# Patient Record
Sex: FEMALE | Race: ASIAN | NOT HISPANIC OR LATINO | Employment: UNEMPLOYED | URBAN - METROPOLITAN AREA
[De-identification: names, ages, dates, MRNs, and addresses within clinical notes are randomized per-mention and may not be internally consistent; named-entity substitution may affect disease eponyms.]

---

## 2017-02-18 ENCOUNTER — SURGERY - HEALTHEAST (OUTPATIENT)
Dept: SURGERY | Facility: HOSPITAL | Age: 22
End: 2017-02-18

## 2017-02-18 ENCOUNTER — ANESTHESIA - HEALTHEAST (OUTPATIENT)
Dept: SURGERY | Facility: HOSPITAL | Age: 22
End: 2017-02-18

## 2017-02-18 ASSESSMENT — MIFFLIN-ST. JEOR
SCORE: 1874.41
SCORE: 1877.13
SCORE: 1869.87

## 2017-02-19 ENCOUNTER — COMMUNICATION - HEALTHEAST (OUTPATIENT)
Dept: SURGERY | Facility: CLINIC | Age: 22
End: 2017-02-19

## 2018-06-11 ENCOUNTER — OFFICE VISIT - HEALTHEAST (OUTPATIENT)
Dept: FAMILY MEDICINE | Facility: CLINIC | Age: 23
End: 2018-06-11

## 2018-06-11 DIAGNOSIS — R10.13 EPIGASTRIC PAIN: ICD-10-CM

## 2018-06-11 LAB
ALBUMIN UR-MCNC: ABNORMAL MG/DL
APPEARANCE UR: CLEAR
BACTERIA #/AREA URNS HPF: ABNORMAL HPF
BILIRUB UR QL STRIP: ABNORMAL
COLOR UR AUTO: YELLOW
GLUCOSE UR STRIP-MCNC: NEGATIVE MG/DL
HGB UR QL STRIP: ABNORMAL
KETONES UR STRIP-MCNC: ABNORMAL MG/DL
LEUKOCYTE ESTERASE UR QL STRIP: ABNORMAL
NITRATE UR QL: NEGATIVE
PH UR STRIP: 7 [PH] (ref 5–8)
RBC #/AREA URNS AUTO: ABNORMAL HPF
SP GR UR STRIP: 1.01 (ref 1–1.03)
SQUAMOUS #/AREA URNS AUTO: ABNORMAL LPF
UROBILINOGEN UR STRIP-ACNC: ABNORMAL
WBC #/AREA URNS AUTO: ABNORMAL HPF

## 2018-06-12 LAB — BACTERIA SPEC CULT: NO GROWTH

## 2018-08-01 ENCOUNTER — OFFICE VISIT - HEALTHEAST (OUTPATIENT)
Dept: FAMILY MEDICINE | Facility: CLINIC | Age: 23
End: 2018-08-01

## 2018-08-01 DIAGNOSIS — K29.70 GASTRITIS: ICD-10-CM

## 2018-08-01 DIAGNOSIS — R11.10 VOMITING: ICD-10-CM

## 2018-08-01 ASSESSMENT — MIFFLIN-ST. JEOR: SCORE: 1860.8

## 2018-09-04 ENCOUNTER — RECORDS - HEALTHEAST (OUTPATIENT)
Dept: ADMINISTRATIVE | Facility: OTHER | Age: 23
End: 2018-09-04

## 2018-12-06 ENCOUNTER — RECORDS - HEALTHEAST (OUTPATIENT)
Dept: ADMINISTRATIVE | Facility: OTHER | Age: 23
End: 2018-12-06

## 2019-05-03 ENCOUNTER — COMMUNICATION - HEALTHEAST (OUTPATIENT)
Dept: FAMILY MEDICINE | Facility: CLINIC | Age: 24
End: 2019-05-03

## 2019-10-10 ENCOUNTER — COMMUNICATION - HEALTHEAST (OUTPATIENT)
Dept: FAMILY MEDICINE | Facility: CLINIC | Age: 24
End: 2019-10-10

## 2019-10-10 ENCOUNTER — OFFICE VISIT - HEALTHEAST (OUTPATIENT)
Dept: FAMILY MEDICINE | Facility: CLINIC | Age: 24
End: 2019-10-10

## 2019-10-10 ENCOUNTER — TRANSFERRED RECORDS (OUTPATIENT)
Dept: HEALTH INFORMATION MANAGEMENT | Facility: CLINIC | Age: 24
End: 2019-10-10

## 2019-10-10 DIAGNOSIS — Z01.419 WELL FEMALE EXAM WITH ROUTINE GYNECOLOGICAL EXAM: ICD-10-CM

## 2019-10-10 DIAGNOSIS — N89.8 VAGINAL ODOR: ICD-10-CM

## 2019-10-10 DIAGNOSIS — G89.29 CHRONIC PAIN OF BOTH SHOULDERS: ICD-10-CM

## 2019-10-10 DIAGNOSIS — L70.8 OTHER ACNE: ICD-10-CM

## 2019-10-10 DIAGNOSIS — N62 LARGE BREASTS: ICD-10-CM

## 2019-10-10 DIAGNOSIS — M25.512 CHRONIC PAIN OF BOTH SHOULDERS: ICD-10-CM

## 2019-10-10 DIAGNOSIS — M25.511 CHRONIC PAIN OF BOTH SHOULDERS: ICD-10-CM

## 2019-10-10 DIAGNOSIS — D22.30 CHANGE IN FACIAL MOLE: ICD-10-CM

## 2019-10-10 LAB
CLUE CELLS: NORMAL
TRICHOMONAS, WET PREP: NORMAL
YEAST, WET PREP: NORMAL

## 2019-10-10 ASSESSMENT — MIFFLIN-ST. JEOR: SCORE: 1767.81

## 2019-10-11 ENCOUNTER — COMMUNICATION - HEALTHEAST (OUTPATIENT)
Dept: FAMILY MEDICINE | Facility: CLINIC | Age: 24
End: 2019-10-11

## 2019-10-11 LAB
HPV SOURCE: NORMAL
HUMAN PAPILLOMA VIRUS 16 DNA: NEGATIVE
HUMAN PAPILLOMA VIRUS 18 DNA: NEGATIVE
HUMAN PAPILLOMA VIRUS FINAL DIAGNOSIS: NORMAL
HUMAN PAPILLOMA VIRUS OTHER HR: NEGATIVE
SPECIMEN DESCRIPTION: NORMAL

## 2019-10-15 ENCOUNTER — COMMUNICATION - HEALTHEAST (OUTPATIENT)
Dept: FAMILY MEDICINE | Facility: CLINIC | Age: 24
End: 2019-10-15

## 2019-10-17 ENCOUNTER — TELEPHONE (OUTPATIENT)
Dept: PLASTIC SURGERY | Facility: CLINIC | Age: 24
End: 2019-10-17

## 2019-10-17 NOTE — TELEPHONE ENCOUNTER
[facility] Call Center    Phone Message    May a detailed message be left on voicemail: yes    Reason for Call: Other: Jaz from [facility] called and would like update on the referral sent on 9/14/19 for breast reduction. Please call back Jaz. Thanks.     Action Taken: Message routed to:  [facility] ([facility] Plastic Surg

## 2019-10-17 NOTE — TELEPHONE ENCOUNTER
Called pt back, LVM.      Unsure if pt needs breast reduction, for cisgender woman or if pt is transgender needing top surgery. To be assessment when pt calls back.     Boris Cooney, SW  Transgender Care Coordinator

## 2019-10-18 ENCOUNTER — COMMUNICATION - HEALTHEAST (OUTPATIENT)
Dept: FAMILY MEDICINE | Facility: CLINIC | Age: 24
End: 2019-10-18

## 2019-10-31 ENCOUNTER — HEALTH MAINTENANCE LETTER (OUTPATIENT)
Age: 24
End: 2019-10-31

## 2019-11-19 ENCOUNTER — OFFICE VISIT (OUTPATIENT)
Dept: PLASTIC SURGERY | Facility: CLINIC | Age: 24
End: 2019-11-19
Attending: PLASTIC SURGERY
Payer: COMMERCIAL

## 2019-11-19 ENCOUNTER — TELEPHONE (OUTPATIENT)
Dept: SURGERY | Facility: CLINIC | Age: 24
End: 2019-11-19

## 2019-11-19 VITALS
BODY MASS INDEX: 39.05 KG/M2 | WEIGHT: 234.4 LBS | DIASTOLIC BLOOD PRESSURE: 72 MMHG | SYSTOLIC BLOOD PRESSURE: 106 MMHG | OXYGEN SATURATION: 100 % | HEIGHT: 65 IN | HEART RATE: 62 BPM | TEMPERATURE: 98.5 F

## 2019-11-19 DIAGNOSIS — N62 HYPERTROPHY OF BREAST: Primary | ICD-10-CM

## 2019-11-19 PROCEDURE — G0463 HOSPITAL OUTPT CLINIC VISIT: HCPCS | Mod: ZF

## 2019-11-19 RX ORDER — CEFAZOLIN SODIUM 1 G/50ML
1 INJECTION, SOLUTION INTRAVENOUS SEE ADMIN INSTRUCTIONS
Status: CANCELLED | OUTPATIENT
Start: 2019-11-19

## 2019-11-19 RX ORDER — CLINDAMYCIN PHOSPHATE 10 MG/G
GEL TOPICAL
Refills: 0 | COMMUNITY
Start: 2019-10-10

## 2019-11-19 RX ORDER — CEFAZOLIN SODIUM 2 G/50ML
2 SOLUTION INTRAVENOUS
Status: CANCELLED | OUTPATIENT
Start: 2019-11-19

## 2019-11-19 ASSESSMENT — PAIN SCALES - GENERAL: PAINLEVEL: NO PAIN (0)

## 2019-11-19 ASSESSMENT — MIFFLIN-ST. JEOR: SCORE: 1805.14

## 2019-11-19 NOTE — PROGRESS NOTES
NEW PATIENT VISIT      PRESENTING COMPLAINT:  Breast reduction.      HISTORY OF PRESENTING COMPLAINT:  Ms. Pepe is 24 years old.  She is here to discuss breast reduction surgery.  She has been large breasted all of her adult and adolescent life.  She has a lot of upper back, neck, shoulder pain, shoulder grooving from bra straps, inframammary fold rashes during summers.  She has used also some over-the-counter medications as well as supportive garments without continued relief.  Never had any problems with her breasts.  No family history of breast cancer, never had a mammogram.  She wears a DDD bra.  She would like to be around a B or C cup.  She is done having children.  She is okay if she cannot breastfeed.  She is okay with scars.      PAST MEDICAL HISTORY:  Nil.      PAST SURGICAL HISTORY:  Cholecystectomy.      MEDICATIONS:  Nil.      ALLERGIES:  Nil.      SOCIAL HISTORY:  Smokes about a pack every 3 days.  She has been doing that for a number of years.  Drinks socially.  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      REVIEW OF SYSTEMS:  Denies chest pain, shortness of breath, MI, CVA, DVT and PE.      PHYSICAL EXAMINATION:  Vital signs are stable.  She is afebrile, in no obvious distress.  She is 5 feet 4-3/4 inches, 234 pounds, BMI of 39 kg/m2.  Body surface area 2.13 m2.  On examination of her breasts, she has grade 3 ptosis.  Right breast slightly larger and lower than the left breast.  Sternal tgwfh-rr-dwqfll distance at about 40 cm.      ASSESSMENT AND PLAN:  Based on above findings, a diagnosis of symptomatic bilateral breast hypertrophy was made.  I had a long discussion with the patient about breast reduction.  Explained to her how it would be done, showed her where the scars would be, explained to her what it entailed.  I was very clear about the risks, benefits and alternatives including pain, infection, bleeding, scarring, asymmetry, seromas, hematomas, wound breakdown, wound dehiscence, prominent scar,  hypertrophic scar, keloid scar, sensation, loss of the nipple and breast, skin necrosis, fat necrosis, nipple necrosis, T-junction site necrosis, DVT, PE, MI, CVA, pneumonia, renal failure and death.  I was very clear that I cannot guarantee cup sizes as well.  Based on her Schnur scale, 775 grams needs to be removed from each breast.  I think that is possible and leave her at about a B cup size.  She may require free nipple graft.  All of this was discussed.  She was okay with it.  Consent obtained.  Photographs obtained.  We will get prior authorization and proceed as indicated.  She needs to be off all nicotine products for at least 6 weeks.  All questions were answered.  She was happy with the visit.  All exam done in the presence of my nurse.      Total time spent with patient 30 minutes, more than half was counseling.

## 2019-11-19 NOTE — LETTER
11/19/2019       RE: Manuel Pepe  [address]  [facility] [address]     Dear Colleague,    Thank you for referring your patient, Manuel Pepe, to the [facility] at [facility]. Please see a copy of my visit note below.    NEW PATIENT VISIT      PRESENTING COMPLAINT:  Breast reduction.      HISTORY OF PRESENTING COMPLAINT:  Ms. Pepe is 24 years old.  She is here to discuss breast reduction surgery.  She has been large breasted all of her adult and adolescent life.  She has a lot of upper back, neck, shoulder pain, shoulder grooving from bra straps, inframammary fold rashes during summers.  She has used also some over-the-counter medications as well as supportive garments without continued relief.  Never had any problems with her breasts.  No family history of breast cancer, never had a mammogram.  She wears a DDD bra.  She would like to be around a B or C cup.  She is done having children.  She is okay if she cannot breastfeed.  She is okay with scars.      PAST MEDICAL HISTORY:  Nil.      PAST SURGICAL HISTORY:  Cholecystectomy.      MEDICATIONS:  Nil.      ALLERGIES:  Nil.      SOCIAL HISTORY:  Smokes about a pack every 3 days.  She has been doing that for a number of years.  Drinks socially.  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      REVIEW OF SYSTEMS:  Denies chest pain, shortness of breath, MI, CVA, DVT and PE.      PHYSICAL EXAMINATION:  Vital signs are stable.  She is afebrile, in no obvious distress.  She is 5 feet 4-3/4 inches, 234 pounds, BMI of 39 kg/m2.  Body surface area 2.13 m2.  On examination of her breasts, she has grade 3 ptosis.  Right breast slightly larger and lower than the left breast.  Sternal oenuc-fj-cjcaim distance at about 40 cm.      ASSESSMENT AND PLAN:  Based on above findings, a diagnosis of symptomatic bilateral breast hypertrophy was made.  I had a long discussion with the patient about breast reduction.  Explained to her how it would be  done, showed her where the scars would be, explained to her what it entailed.  I was very clear about the risks, benefits and alternatives including pain, infection, bleeding, scarring, asymmetry, seromas, hematomas, wound breakdown, wound dehiscence, prominent scar, hypertrophic scar, keloid scar, sensation, loss of the nipple and breast, skin necrosis, fat necrosis, nipple necrosis, T-junction site necrosis, DVT, PE, MI, CVA, pneumonia, renal failure and death.  I was very clear that I cannot guarantee cup sizes as well.  Based on her Schnur scale, 775 grams needs to be removed from each breast.  I think that is possible and leave her at about a B cup size.  She may require free nipple graft.  All of this was discussed.  She was okay with it.  Consent obtained.  Photographs obtained.  We will get prior authorization and proceed as indicated.  She needs to be off all nicotine products for at least 6 weeks.  All questions were answered.  She was happy with the visit.  All exam done in the presence of my nurse.      Total time spent with patient 30 minutes, more than half was counseling.           Again, thank you for allowing me to participate in the care of your patient.      Sincerely,    ELIZABETH Zapata MD

## 2019-11-19 NOTE — NURSING NOTE
"Oncology Rooming Note    November 19, 2019 8:49 AM   Manuel Pepe is a 24 year old female who presents for:    Chief Complaint   Patient presents with     New Patient     New Patient.  Chronic pain of both shoulders.      Initial Vitals: /72   Pulse 62   Temp 98.5  F (36.9  C) (Oral)   Ht 1.645 m (5' 4.75\")   Wt 106.3 kg (234 lb 6.4 oz)   SpO2 100%   BMI 39.31 kg/m   Estimated body mass index is 39.31 kg/m  as calculated from the following:    Height as of this encounter: 1.645 m (5' 4.75\").    Weight as of this encounter: 106.3 kg (234 lb 6.4 oz). Body surface area is 2.2 meters squared.  No Pain (0) Comment: Data Unavailable   No LMP recorded.  Allergies reviewed: Yes  Medications reviewed: Yes    Medications: Medication refills not needed today.  Pharmacy name entered into EPIC: Data Unavailable    Clinical concerns: No additional concerns.  Dr. Zapata was notified.      Katelynn Martínez CMA              "

## 2019-11-19 NOTE — LETTER
Date:November 20, 2019      Patient was self referred, no letter generated. Do not send.        [facility]

## 2019-11-20 ENCOUNTER — PREP FOR PROCEDURE (OUTPATIENT)
Dept: SURGERY | Facility: CLINIC | Age: 24
End: 2019-11-20

## 2019-11-20 DIAGNOSIS — N62 HYPERTROPHY OF BREAST: Primary | ICD-10-CM

## 2019-11-26 ENCOUNTER — AMBULATORY - HEALTHEAST (OUTPATIENT)
Dept: LAB | Facility: CLINIC | Age: 24
End: 2019-11-26

## 2019-11-26 DIAGNOSIS — Z01.419 WELL FEMALE EXAM WITH ROUTINE GYNECOLOGICAL EXAM: ICD-10-CM

## 2019-11-26 LAB
ALBUMIN SERPL-MCNC: 3.9 G/DL (ref 3.5–5)
ALP SERPL-CCNC: 43 U/L (ref 45–120)
ALT SERPL W P-5'-P-CCNC: 17 U/L (ref 0–45)
ANION GAP SERPL CALCULATED.3IONS-SCNC: 8 MMOL/L (ref 5–18)
AST SERPL W P-5'-P-CCNC: 11 U/L (ref 0–40)
BILIRUB SERPL-MCNC: 0.6 MG/DL (ref 0–1)
BUN SERPL-MCNC: 13 MG/DL (ref 8–22)
CALCIUM SERPL-MCNC: 9 MG/DL (ref 8.5–10.5)
CHLORIDE BLD-SCNC: 105 MMOL/L (ref 98–107)
CHOLEST SERPL-MCNC: 154 MG/DL
CO2 SERPL-SCNC: 25 MMOL/L (ref 22–31)
CREAT SERPL-MCNC: 0.73 MG/DL (ref 0.6–1.1)
ERYTHROCYTE [DISTWIDTH] IN BLOOD BY AUTOMATED COUNT: 10.9 % (ref 11–14.5)
FASTING STATUS PATIENT QL REPORTED: YES
GFR SERPL CREATININE-BSD FRML MDRD: >60 ML/MIN/1.73M2
GLUCOSE BLD-MCNC: 93 MG/DL (ref 70–125)
HCT VFR BLD AUTO: 44.7 % (ref 35–47)
HDLC SERPL-MCNC: 62 MG/DL
HGB BLD-MCNC: 15 G/DL (ref 12–16)
LDLC SERPL CALC-MCNC: 76 MG/DL
MCH RBC QN AUTO: 30.9 PG (ref 27–34)
MCHC RBC AUTO-ENTMCNC: 33.6 G/DL (ref 32–36)
MCV RBC AUTO: 92 FL (ref 80–100)
PLATELET # BLD AUTO: 230 THOU/UL (ref 140–440)
PMV BLD AUTO: 8.5 FL (ref 7–10)
POTASSIUM BLD-SCNC: 4.3 MMOL/L (ref 3.5–5)
PROT SERPL-MCNC: 7.1 G/DL (ref 6–8)
RBC # BLD AUTO: 4.87 MILL/UL (ref 3.8–5.4)
SODIUM SERPL-SCNC: 138 MMOL/L (ref 136–145)
TRIGL SERPL-MCNC: 80 MG/DL
TSH SERPL DL<=0.005 MIU/L-ACNC: 1.12 UIU/ML (ref 0.3–5)
WBC: 7.5 THOU/UL (ref 4–11)

## 2019-11-27 LAB
C TRACH DNA SPEC QL PROBE+SIG AMP: NEGATIVE
N GONORRHOEA DNA SPEC QL NAA+PROBE: NEGATIVE

## 2019-11-29 ENCOUNTER — TELEPHONE (OUTPATIENT)
Dept: SURGERY | Facility: CLINIC | Age: 24
End: 2019-11-29

## 2019-11-29 NOTE — TELEPHONE ENCOUNTER
Left message to schedule procedure with Dr Mata Zapata    Details left with my direct contact number for scheduling.     Leandra Rodriguez  Judi-Op Surgical Coordinator  [phone]

## 2019-12-04 ENCOUNTER — TELEPHONE (OUTPATIENT)
Dept: SURGERY | Facility: CLINIC | Age: 24
End: 2019-12-04

## 2019-12-11 ENCOUNTER — OFFICE VISIT - HEALTHEAST (OUTPATIENT)
Dept: FAMILY MEDICINE | Facility: CLINIC | Age: 24
End: 2019-12-11

## 2019-12-11 DIAGNOSIS — Z11.3 SCREEN FOR STD (SEXUALLY TRANSMITTED DISEASE): ICD-10-CM

## 2019-12-11 DIAGNOSIS — H65.92 FLUID LEVEL BEHIND TYMPANIC MEMBRANE OF LEFT EAR: ICD-10-CM

## 2019-12-11 LAB — HIV 1+2 AB+HIV1 P24 AG SERPL QL IA: NEGATIVE

## 2019-12-11 ASSESSMENT — MIFFLIN-ST. JEOR: SCORE: 1745.13

## 2019-12-12 LAB
C TRACH DNA SPEC QL PROBE+SIG AMP: NEGATIVE
HBV SURFACE AG SERPL QL IA: NEGATIVE
HCV AB SERPL QL IA: NEGATIVE
HEPATITIS B SURFACE ANTIBODY LHE- HISTORICAL: NEGATIVE
N GONORRHOEA DNA SPEC QL NAA+PROBE: NEGATIVE
T PALLIDUM AB SER QL: NEGATIVE

## 2019-12-13 ENCOUNTER — COMMUNICATION - HEALTHEAST (OUTPATIENT)
Dept: FAMILY MEDICINE | Facility: CLINIC | Age: 24
End: 2019-12-13

## 2019-12-13 LAB
HSV1 IGG SERPL QL IA: POSITIVE
HSV2 IGG SERPL QL IA: NEGATIVE

## 2019-12-16 ENCOUNTER — COMMUNICATION - HEALTHEAST (OUTPATIENT)
Dept: FAMILY MEDICINE | Facility: CLINIC | Age: 24
End: 2019-12-16

## 2019-12-16 DIAGNOSIS — B00.9 HSV (HERPES SIMPLEX VIRUS) INFECTION: ICD-10-CM

## 2019-12-27 ENCOUNTER — TELEPHONE (OUTPATIENT)
Dept: SURGERY | Facility: CLINIC | Age: 24
End: 2019-12-27

## 2019-12-27 NOTE — TELEPHONE ENCOUNTER
Contacted pt to see about scheduling surgery. Gave her Flor's number to begin the PA process. She smokes every other day now, but I said that she may need to be completely nicotine free for 6 weeks. I reached out to Dr. Zapata to see how he wants to proceed.

## 2019-12-30 ENCOUNTER — PATIENT OUTREACH (OUTPATIENT)
Dept: PLASTIC SURGERY | Facility: CLINIC | Age: 24
End: 2019-12-30

## 2019-12-30 NOTE — PATIENT INSTRUCTIONS
Left message for pt regarding need to stop all nicotine products for at least 6 weeks prior to surgery. Provided direct contact information and requested call back to discuss. Alicia VILLA RNCC

## 2020-01-07 ENCOUNTER — TELEPHONE (OUTPATIENT)
Dept: SURGERY | Facility: CLINIC | Age: 25
End: 2020-01-07

## 2020-01-15 ENCOUNTER — TELEPHONE (OUTPATIENT)
Dept: SURGERY | Facility: CLINIC | Age: 25
End: 2020-01-15

## 2020-01-15 NOTE — TELEPHONE ENCOUNTER
received VM from patient, need to call her at specific times.  called her back-faxed her bcbs criteria information that is needed

## 2020-02-03 ENCOUNTER — TELEPHONE (OUTPATIENT)
Dept: SURGERY | Facility: CLINIC | Age: 25
End: 2020-02-03

## 2020-02-03 NOTE — TELEPHONE ENCOUNTER
Left message to schedule procedure with Dr Mata Zapata    Details left with my direct contact number for scheduling.     Leandra Rodriguez  Judi-Op Surgical Coordinator  [phone]

## 2020-02-10 ENCOUNTER — HOSPITAL ENCOUNTER (OUTPATIENT)
Facility: AMBULATORY SURGERY CENTER | Age: 25
End: 2020-02-10
Attending: PLASTIC SURGERY
Payer: COMMERCIAL

## 2020-02-10 DIAGNOSIS — N62 HYPERTROPHY OF BREAST: ICD-10-CM

## 2020-02-10 NOTE — TELEPHONE ENCOUNTER
ORDER RECEIVED VIA: CASE MESSAGE    Spoke with patient to schedule surgery with Dr Mata Zpaata     [facility]    Patient will have pre-surgery visit with PCP      -Patient advised H&P is needed or surgery cancellation may occur    Post-Op care appointment scheduled?  YES on 3/17    Patient is aware a / is needed day of surgery.     Surgery packet was sent via [place], patient has my direct contact information for any further questions.     Leandra Rodriguez  Surgical Judi-Op Coordinator  [phone]

## 2020-02-11 ENCOUNTER — TELEPHONE (OUTPATIENT)
Dept: SURGERY | Facility: CLINIC | Age: 25
End: 2020-02-11

## 2020-02-11 NOTE — TELEPHONE ENCOUNTER
left a very detailed message -surgery is scheduled for 3/5/20, no pa has been requested yet, i need documentation for that request

## 2020-02-13 ENCOUNTER — TRANSFERRED RECORDS (OUTPATIENT)
Dept: HEALTH INFORMATION MANAGEMENT | Facility: CLINIC | Age: 25
End: 2020-02-13

## 2020-02-13 ENCOUNTER — OFFICE VISIT - HEALTHEAST (OUTPATIENT)
Dept: FAMILY MEDICINE | Facility: CLINIC | Age: 25
End: 2020-02-13

## 2020-02-13 DIAGNOSIS — Z01.818 PRE-OP EXAM: ICD-10-CM

## 2020-02-13 DIAGNOSIS — N62 GYNECOMASTIA: ICD-10-CM

## 2020-02-13 DIAGNOSIS — Z11.3 ROUTINE SCREENING FOR STI (SEXUALLY TRANSMITTED INFECTION): ICD-10-CM

## 2020-02-13 LAB
ANION GAP SERPL CALCULATED.3IONS-SCNC: 8 MMOL/L (ref 5–18)
BASOPHILS # BLD AUTO: 0.1 THOU/UL (ref 0–0.2)
BASOPHILS NFR BLD AUTO: 1 % (ref 0–2)
BUN SERPL-MCNC: 15 MG/DL (ref 8–22)
CALCIUM SERPL-MCNC: 9.1 MG/DL (ref 8.5–10.5)
CHLORIDE BLD-SCNC: 108 MMOL/L (ref 98–107)
CO2 SERPL-SCNC: 27 MMOL/L (ref 22–31)
CREAT SERPL-MCNC: 0.72 MG/DL (ref 0.6–1.1)
EOSINOPHIL # BLD AUTO: 0.1 THOU/UL (ref 0–0.4)
EOSINOPHIL NFR BLD AUTO: 2 % (ref 0–6)
ERYTHROCYTE [DISTWIDTH] IN BLOOD BY AUTOMATED COUNT: 11.2 % (ref 11–14.5)
GFR SERPL CREATININE-BSD FRML MDRD: >60 ML/MIN/1.73M2
GLUCOSE BLD-MCNC: 78 MG/DL (ref 70–125)
HCT VFR BLD AUTO: 40.3 % (ref 35–47)
HGB BLD-MCNC: 13.4 G/DL (ref 12–16)
HIV 1+2 AB+HIV1 P24 AG SERPL QL IA: NEGATIVE
LYMPHOCYTES # BLD AUTO: 2.3 THOU/UL (ref 0.8–4.4)
LYMPHOCYTES NFR BLD AUTO: 26 % (ref 20–40)
MCH RBC QN AUTO: 30.7 PG (ref 27–34)
MCHC RBC AUTO-ENTMCNC: 33.1 G/DL (ref 32–36)
MCV RBC AUTO: 93 FL (ref 80–100)
MONOCYTES # BLD AUTO: 0.5 THOU/UL (ref 0–0.9)
MONOCYTES NFR BLD AUTO: 5 % (ref 2–10)
NEUTROPHILS # BLD AUTO: 5.9 THOU/UL (ref 2–7.7)
NEUTROPHILS NFR BLD AUTO: 67 % (ref 50–70)
PLATELET # BLD AUTO: 253 THOU/UL (ref 140–440)
PMV BLD AUTO: 8.3 FL (ref 7–10)
POTASSIUM BLD-SCNC: 3.8 MMOL/L (ref 3.5–5)
RBC # BLD AUTO: 4.35 MILL/UL (ref 3.8–5.4)
SODIUM SERPL-SCNC: 143 MMOL/L (ref 136–145)
WBC: 8.9 THOU/UL (ref 4–11)

## 2020-02-13 ASSESSMENT — MIFFLIN-ST. JEOR: SCORE: 1740.59

## 2020-02-17 ENCOUNTER — PATIENT OUTREACH (OUTPATIENT)
Dept: PLASTIC SURGERY | Facility: CLINIC | Age: 25
End: 2020-02-17

## 2020-02-17 NOTE — PATIENT INSTRUCTIONS
Spoke with pt regarding need to be off of nicotine products for 6 weeks prior to surgery. Pt states she quit all nicotine about 4 weeks ago. Congratulated pt and confirmed upcoming appt on 2/25/20 at 9am. Alicia VILLA RNCC

## 2020-02-21 ENCOUNTER — PATIENT OUTREACH (OUTPATIENT)
Dept: PLASTIC SURGERY | Facility: CLINIC | Age: 25
End: 2020-02-21

## 2020-02-21 ENCOUNTER — TELEPHONE (OUTPATIENT)
Dept: SURGERY | Facility: CLINIC | Age: 25
End: 2020-02-21

## 2020-02-21 ENCOUNTER — COMMUNICATION - HEALTHEAST (OUTPATIENT)
Dept: FAMILY MEDICINE | Facility: CLINIC | Age: 25
End: 2020-02-21

## 2020-02-21 NOTE — PATIENT INSTRUCTIONS
Spoke with pt regarding upcoming surgery. Discussed the need for appropriate documentation in order to complete PA process. Pt states understanding. Pt states she quit her job to complete this surgery. Pt inquires if there is any way to proceed without the documentation. Explained that we could proceed with surgery if pt is comfortable paying out of pocket. Pt states she is no able to do this. Explained that documentation is needed to complete PA to ensure that surgery is covered before proceeding. Pt states understanding and denies any additional questions or concerns. Alicia VILLA RNCC

## 2020-08-14 ENCOUNTER — COMMUNICATION - HEALTHEAST (OUTPATIENT)
Dept: SCHEDULING | Facility: CLINIC | Age: 25
End: 2020-08-14

## 2020-11-07 ENCOUNTER — HEALTH MAINTENANCE LETTER (OUTPATIENT)
Age: 25
End: 2020-11-07

## 2020-11-23 ENCOUNTER — COMMUNICATION - HEALTHEAST (OUTPATIENT)
Dept: SCHEDULING | Facility: CLINIC | Age: 25
End: 2020-11-23

## 2020-12-04 ENCOUNTER — OFFICE VISIT - HEALTHEAST (OUTPATIENT)
Dept: FAMILY MEDICINE | Facility: CLINIC | Age: 25
End: 2020-12-04

## 2020-12-04 DIAGNOSIS — L70.8 OTHER ACNE: ICD-10-CM

## 2020-12-04 DIAGNOSIS — Z72.0 TOBACCO USE: ICD-10-CM

## 2020-12-04 DIAGNOSIS — R07.89 ATYPICAL CHEST PAIN: ICD-10-CM

## 2020-12-04 ASSESSMENT — ANXIETY QUESTIONNAIRES
2. NOT BEING ABLE TO STOP OR CONTROL WORRYING: SEVERAL DAYS
4. TROUBLE RELAXING: NOT AT ALL
7. FEELING AFRAID AS IF SOMETHING AWFUL MIGHT HAPPEN: NOT AT ALL
IF YOU CHECKED OFF ANY PROBLEMS ON THIS QUESTIONNAIRE, HOW DIFFICULT HAVE THESE PROBLEMS MADE IT FOR YOU TO DO YOUR WORK, TAKE CARE OF THINGS AT HOME, OR GET ALONG WITH OTHER PEOPLE: SOMEWHAT DIFFICULT
GAD7 TOTAL SCORE: 4
1. FEELING NERVOUS, ANXIOUS, OR ON EDGE: SEVERAL DAYS
3. WORRYING TOO MUCH ABOUT DIFFERENT THINGS: SEVERAL DAYS
5. BEING SO RESTLESS THAT IT IS HARD TO SIT STILL: NOT AT ALL
6. BECOMING EASILY ANNOYED OR IRRITABLE: SEVERAL DAYS

## 2020-12-04 ASSESSMENT — PATIENT HEALTH QUESTIONNAIRE - PHQ9: SUM OF ALL RESPONSES TO PHQ QUESTIONS 1-9: 3

## 2021-05-27 ASSESSMENT — PATIENT HEALTH QUESTIONNAIRE - PHQ9: SUM OF ALL RESPONSES TO PHQ QUESTIONS 1-9: 3

## 2021-05-28 ASSESSMENT — ANXIETY QUESTIONNAIRES: GAD7 TOTAL SCORE: 4

## 2021-05-30 VITALS — WEIGHT: 251 LBS | HEIGHT: 65 IN | BODY MASS INDEX: 41.82 KG/M2

## 2021-06-01 VITALS — WEIGHT: 248 LBS | HEIGHT: 65 IN | BODY MASS INDEX: 41.32 KG/M2

## 2021-06-01 VITALS — BODY MASS INDEX: 38.52 KG/M2 | WEIGHT: 231.5 LBS

## 2021-06-02 NOTE — PROGRESS NOTES
No Bacterial vaginosis or yeast infection, please call results to the patient or send a letter if not reachable by phone.

## 2021-06-02 NOTE — PROGRESS NOTES
Pap cell test is negative, next recheck would be in 3 years.  please call results to the patient or send a letter if not reachable by phone.

## 2021-06-02 NOTE — PROGRESS NOTES
HPV test is negative, pap cell test is pending, please call results to the patient or send a letter if not reachable by phone.

## 2021-06-02 NOTE — PATIENT INSTRUCTIONS - HE
Advised health diet, 6-8 fruit and vegetables daily and discussed juicing in am with protein powder for breakfast.  Advised regular exercise, discussed regular aerobic exercise and strength training.  Advised not more than 1 alcoholic drinks in any given day.  Caffeine: not more than 2 daily.  Water: 6-8 glasses daily.  Multivitamin with Vitamin D 2000 units and iron for women.

## 2021-06-03 VITALS
SYSTOLIC BLOOD PRESSURE: 110 MMHG | OXYGEN SATURATION: 98 % | BODY MASS INDEX: 39.44 KG/M2 | HEART RATE: 72 BPM | WEIGHT: 231 LBS | HEIGHT: 64 IN | TEMPERATURE: 98.2 F | DIASTOLIC BLOOD PRESSURE: 68 MMHG

## 2021-06-04 VITALS — TEMPERATURE: 98.5 F | RESPIRATION RATE: 16 BRPM | BODY MASS INDEX: 38.58 KG/M2 | WEIGHT: 226 LBS | HEIGHT: 64 IN

## 2021-06-04 VITALS
BODY MASS INDEX: 38.41 KG/M2 | WEIGHT: 225 LBS | DIASTOLIC BLOOD PRESSURE: 50 MMHG | HEIGHT: 64 IN | SYSTOLIC BLOOD PRESSURE: 90 MMHG

## 2021-06-04 NOTE — TELEPHONE ENCOUNTER
Medication Request    Medication name: Valtrex    Pharmacy Name and Location: [id number] - [place], [place] - [address] AT [facility] [address]    Reason for request:   prophylactic therapy for Herpes # 1    When did you use medication last?:  Unknown    Patient offered appointment:    recent visit.  Okay to leave a detailed   message: yes    Patient is requesting medication be available for when a outbreak occurs she can initiate treatment.    Please send Rx to pharmacy and notify patient of the outcome.

## 2021-06-04 NOTE — PROGRESS NOTES
Labs are normal, only positive is for cold sore virus.  Please call results to the patient or send a letter if not reachable by phone.

## 2021-06-04 NOTE — TELEPHONE ENCOUNTER
Phoned patient regarding results patient wants to know if she needs anything for her cold sores or if she needs to make a follow up appointment. Otherwise showed understanding.

## 2021-06-04 NOTE — PROGRESS NOTES
"Assessment/Plan:        1. Screen for STD (sexually transmitted disease)    - Chlamydia trachomatis & Neisseria gonorrhoeae, Amplified Detection  - Hepatitis B Surface Antibody (Anti-HBs)  - Hepatitis B Surface Antigen (HBsAG)  - Herpes simplex Virus (Type 1 and 2) IgG Antibody  - HIV Antigen/Antibody Screening Cascade  - Hepatitis C Antibody (Anti-HCV)  - Syphilis Screen, [place]  We will follow-up to the results of the study and manage accordingly.      2. Fluid level behind tympanic membrane of left ear  Exam findings were discussed    Discussed trial of flonase, antihistamines/ decongestants.     Close follow up if no significant change or improvement as anticipated.         Subjective:    Patient ID:   Manuel Pepe is a 24 y.o. female comes in for STD screening as she had one unprotected intercourse a month ago with a new partner.   She currently has no vaginal symptoms.       She is asking to have her left ear examined as she is having some pressure.   No other symptoms      Review of Systems  Allergy: reviewed  General : negative  A complete 5 point review of systems was obtained and is negative other than what is stated in the HPI.      The following patient's history were reviewed and updated as appropriate:   She  has a past medical history of Cholecystitis, EGD- Gastritis and deodenitis  (9/13/2018), Gastritis and duodenitis (9/13/2018), GERD (gastroesophageal reflux disease), Morbid obesity (H), and Vaginal delivery..      Outpatient Encounter Medications as of 12/11/2019   Medication Sig Dispense Refill     clindamycin (CLINDAGEL) 1 % gel Apply to affected area 2 times daily 30 g 0     No facility-administered encounter medications on file as of 12/11/2019.          Objective:   Temp 98.5  F (36.9  C) (Oral)   Resp 16   Ht 5' 4\" (1.626 m)   Wt (!) 226 lb (102.5 kg)   LMP 12/06/2019 (Exact Date)   BMI 38.79 kg/m        Physical Exam    General Appearance:    Alert, cooperative, no distress   Eyes:    " PERRL, conjunctiva/corneas clear, EOM's intact, both eyes   Ears:    YAMILKA bilaterally , normal canals    Throat:   mucous membranes moist, pharynx normal without lesions   Neck:   Supple, symmetrical, trachea midline, no adenopathy;     thyroid:  no enlargement/tenderness/nodules;    Lungs:     clear to auscultation, no wheezes, rales or rhonchi, symmetric air entry     Heart:    Regular rate and rhythm, S1 and S2 normal, no murmur, rub   or gallop   Skin:   Skin color, texture, turgor normal, no rashes or lesions

## 2021-06-04 NOTE — TELEPHONE ENCOUNTER
----- Message from Bela Michael PA-C sent at 12/13/2019 12:58 PM CST -----  Labs are normal, only positive is for cold sore virus.  Please call results to the patient or send a letter if not reachable by phone.

## 2021-06-06 NOTE — PROGRESS NOTES
Preoperative Exam    Scheduled Procedure: Breast  Surgery Date:  3/5/2020 (TBD may be sooner)  Surgery Location: [facility]    Surgeon:  Dr. Zapata     Assessment/Plan:     1. Pre-op exam    - HM1(CBC and Differential); Future  - Basic Metabolic Panel; Future    2. Gynecomastia    - HM1(CBC and Differential); Future  - Basic Metabolic Panel; Future     Surgical Procedure Risk: Low (reported cardiac risk generally < 1%)  Have you had prior anesthesia?: Yes  Have you or any family members had a previous anesthesia reaction:  No  Do you or any family members have a history of a clotting or bleeding disorder?: No  Cardiac Risk Assessment: no increased risk for major cardiac complications    APPROVAL GIVEN to proceed with proposed procedure, without further diagnostic evaluation    Please Note:  no apnea    Functional Status: Independent  Patient plans to recover at home with family.     Subjective:      Manuel Pepe is a 24 y.o. female who presents for a preoperative consultation.  She will be having a breast reduction.     All other systems reviewed and are negative, other than those listed in the HPI.    Pertinent History  Do you have difficulty breathing or chest pain after walking up a flight of stairs: No  History of obstructive sleep apnea: No  Steroid use in the last 6 months: No  Frequent Aspirin/NSAID use: No  Prior Blood Transfusion: No  Prior Blood Transfusion Reaction: No  If for some reason prior to, during or after the procedure, if it is medically indicated, would you be willing to have a blood transfusion?:  There is no transfusion refusal.    Current Outpatient Medications   Medication Sig Dispense Refill     clindamycin (CLINDAGEL) 1 % gel Apply to affected area 2 times daily 30 g 0     valACYclovir (VALTREX) 500 MG tablet Take 1 tablet (500 mg total) by mouth 2 (two) times a day. 30 tablet 6     No current facility-administered medications for this visit.         No Known  Allergies    Patient Active Problem List   Diagnosis      (normal spontaneous vaginal delivery)     Fetal heart rate deceleration, delivered, current hospitalization     Nuchal cord with compression, delivered, current hospitalization     Excessive weight gain during pregnancy in third trimester     Morbid obesity (H)     Cholecystitis     S/P laparoscopic cholecystectomy     EGD- Gastritis and duodenitis      Gastritis and duodenitis       Past Medical History:   Diagnosis Date     Cholecystitis      EGD- Gastritis and deodenitis  2018    Gastritis and deodenitis      Gastritis and duodenitis 2018     GERD (gastroesophageal reflux disease)      Morbid obesity (H)      Vaginal delivery     x2       Past Surgical History:   Procedure Laterality Date     LAPAROSCOPIC CHOLECYSTECTOMY N/A 2017    Procedure: CHOLECYSTECTOMY, LAPAROSCOPIC;  Surgeon: Morris Neumann MD;  [place] [facility] [place]  Service:        Social History     Socioeconomic History     Marital status: Single     Spouse name: Not on file     Number of children: Not on file     Years of education: Not on file     Highest education level: Not on file   Occupational History     Not on file   Social Needs     Financial resource strain: Not on file     Food insecurity:     Worry: Not on file     Inability: Not on file     Transportation needs:     Medical: Not on file     Non-medical: Not on file   Tobacco Use     Smoking status: Former Smoker     Packs/day: 0.00     Smokeless tobacco: Never Used     Tobacco comment: Quit, 1 month    Substance and Sexual Activity     Alcohol use: Yes     Comment: occasionally      Drug use: Not Currently     Types: Marijuana     Sexual activity: Yes     Partners: Male   Lifestyle     Physical activity:     Days per week: Not on file     Minutes per session: Not on file     Stress: Not on file   Relationships     Social connections:     Talks on phone: Not on file     Gets together: Not on file      "Attends Ho[employer] service: Not on file     Active member of club or organization: Not on file     Attends meetings of clubs or organizations: Not on file     Relationship status: Not on file     Intimate partner violence:     Fear of current or ex partner: Not on file     Emotionally abused: Not on file     Physically abused: Not on file     Forced sexual activity: Not on file   Other Topics Concern     Not on file   Social History Narrative     Not on file       Patient Care Team:  Bela Michael PA-C as PCP - General (Physician Assistant)  Bela Michael PA-C as Assigned PCP          Objective:     Vitals:    02/13/20 1643   BP: 90/50   Weight: (!) 225 lb (102.1 kg)   Height: 5' 4\" (1.626 m)         Physical Exam:  Alert, cooperative, well-hydrated.  Appears well.  Eyes: Pupils equal, round, reactive to light.  HEENT: Sclera white, nares patent, MMM   Lungs: Clear to auscultation. No retractions, no increased work of respiration, equal chest rise.   Heart: Regular rate and rhythm, no murmurs, clicks,    Gallops.  Abdomen: Soft, bowel sounds in 4 quadrants with no tenderness to palpation, no organomegaly or masses, no aortic or renal bruits.  Extremities: no tenderness to palpation of gastrocnemius, bilaterally.  Skin: no increased warmth, edema, or erythema of lower legs bilaterally.  Back:  No cervical, thoracic or lumbar tenderness to spinous processes or musculature.    Neuro: pupils equal and reactive to light bilaterally, CN II - XII  intact. No focal motor/sensory deficits. M/S 5/5 all extremities. DTR 2/4 all extremities      There are no Patient Instructions on file for this visit.      Labs:      Immunization History   Administered Date(s) Administered     Dtap 03/15/1995, 05/15/1995, 07/14/1995, 10/25/1996, 04/01/1999     HPV Quadrivalent 12/12/2008, 01/13/2009, 03/09/2010     Hep B, Peds or Adolescent 1994, 1994, 05/15/1998     Hepatitis A, Ped/Adol 2 Dose IM (18yr & under) " 01/13/2009     HiB, historic,unspecified 03/15/1996     Hib (PRP-OMP) 03/15/1995, 07/14/1995, 05/20/1996, 04/01/1999     Influenza, inj, historic,unspecified 12/02/2015     Influenza,seasonal quad, PF, =/> 6months 02/19/2017     Influenza,seasonal, Inj IIV3 01/08/2009     MMR 05/20/1996, 04/10/2006, 11/11/2014     Meningococcal MCV4O 01/08/2009     OPV,Trivalent,Historic(6494-6551 only) 03/15/1995, 05/15/1995, 07/14/1995, 04/01/1999     Td, Adult, Absorbed 04/10/2006     Tdap 01/08/2009     Varicella 05/13/1999           Electronically signed by Bela Michael PA-C 02/13/20 4:48 PM

## 2021-06-06 NOTE — TELEPHONE ENCOUNTER
Forms Request  Name of form/paperwork: Other:  Last six months of charting and pre op  Have you been seen for this request: yes   Do we have the form: Pre op and labs of CBC/ metobolic panel.  When is form needed by: Please refax pre op.  Patient transferred to [facility] to get the other charting.   How would you like the form returned: Fax:  Dr Isidro CHRISTENSEN of [place] fax [phone]   Patient Notified form requests are processed in 3-5 business days: No    Okay to leave a detailed message? No

## 2021-06-06 NOTE — TELEPHONE ENCOUNTER
Who is calling:  patient  Reason for Call:  Requesting call back from pcp to discuss her surgery that's schedule on 03/05/2020. Surgeons office is saying they can not proceed with the surgery because they do not have the pre-op notes including records from 6 months before stating she has been seen for the issue.   Patient is very stressed she quit her job her this because she would be unable to attend work for a month and her breast pain is uncomfortable.  Date of last appointment with primary care: 02/13/20   Okay to leave a detailed message: Yes

## 2021-06-06 NOTE — TELEPHONE ENCOUNTER
Phoned patient regarding below request, confirmed fax # and will fax over Pre-op again and office note form PCP 10/10/2019. Patient stated she was unaware that it was needed to have 6 months worth of office notes discussing the breast reduction. Patient was only seen for physical and spoke about this, also the pre-op. PCP comes back Monday 2/24/2020 and will discuss this with PCP. Patient is aware that we will send the one note over and see if that will work if not will give patient a call back Monday once PCP comes back and see what else we can do so that patient can proceed with the surgery.     Pre-op note was faxed to [phone]

## 2021-06-08 NOTE — ANESTHESIA CARE TRANSFER NOTE
Last vitals:   Vitals:    02/18/17 1010   BP: 132/70   Pulse: 90   Resp: 11   Temp: 36.7  C (98  F)   SpO2: 100%     Patient's level of consciousness is drowsy  Spontaneous respirations: yes  Maintains airway independently: yes  Dentition unchanged: yes  Oropharynx: oropharynx clear of all foreign objects    QCDR Measures:  ASA# 20 - Surgical Safety Checklist: ASA20A - Safety Checks Done  PQRS# 430 - Adult PONV Prevention: 4558F - Pt received => 2 anti-emetic agents (different classes) preop & intraop  ASA# 8 - Peds PONV Prevention: NA - Not pediatric patient, not GA or 2 or more risk factors NOT present  PQRS# 424 - Judi-op Temp Management: 4559F - At least one body temp DOCUMENTED => 35.5C or 95.9F within required timeframe  PQRS# 426 - PACU Transfer Protocol: - Transfer of care checklist used  ASA# 14 - Acute Post-op Pain: ASA14B - Patient did NOT experience pain >= 7 out of 10    I completed my SBAR handoff to the receiving nurse per policy and procedure.

## 2021-06-08 NOTE — ANESTHESIA POSTPROCEDURE EVALUATION
Patient: Manuel Pepe  CHOLECYSTECTOMY, LAPAROSCOPIC  Anesthesia type: general    Patient location: PACU  Last vitals:   Vitals:    02/18/17 1030   BP: 140/69   Pulse: 82   Resp: 10   Temp:    SpO2: 100%     Post vital signs: stable  Level of consciousness: awake and responds to simple questions  Post-anesthesia pain: pain controlled  Post-anesthesia nausea and vomiting: no  Pulmonary: unassisted, spontaneous ventilation, face mask  Cardiovascular: stable and blood pressure at baseline  Hydration: adequate  Anesthetic events: no    QCDR Measures:  ASA# 11 - Judi-op Cardiac Arrest: ASA11B - Patient did NOT experience unanticipated cardiac arrest  ASA# 12 - Judi-op Mortality Rate: ASA12B - Patient did NOT die  ASA# 13 - PACU Re-Intubation Rate: ASA13B - Patient did NOT require a new airway mgmt  ASA# 10 - Composite Anes Safety: ASA10A - No serious adverse event  ASA# 38 - New Corneal Injury: ASA38A - No new exposure keratitis or corneal abrasion in PACU    Additional Notes:

## 2021-06-13 NOTE — PROGRESS NOTES
"Manuel Pepe is a 25 y.o. female who is being evaluated via a billable video visit.      The patient has been notified of following:     \"This video visit will be conducted via a call between you and your physician/provider. We have found that certain health care needs can be provided without the need for an in-person physical exam.  This service lets us provide the care you need with a video conversation.  If a prescription is necessary we can send it directly to your pharmacy.  If lab work is needed we can place an order for that and you can then stop by our lab to have the test done at a later time.    Video visits are billed at different rates depending on your insurance coverage. Please reach out to your insurance provider with any questions.    If during the course of the call the physician/provider feels a video visit is not appropriate, you will not be charged for this service.\"    Patient has given verbal consent to a Video visit? Yes  How would you like to obtain your AVS? AVS Preference: MyChart.  If dropped by the video visit, the video invitation should be sent to: Text to cell phone: [phone]  Will anyone else be joining your video visit? No      Video Start Time: 3:47pm    Additional provider notes:   Assessment / Impression     1. Atypical chest pain     2. Other acne  clindamycin (CLINDAGEL) 1 % gel   3. Tobacco use           Plan:     Patient is currently asymptomatic, discussed with patient that on a virtual visit alone, is somewhat difficult to determine exact cause of her symptoms yesterday.  However, she did have recent normal EKG and chest x-ray which is somewhat reassuring and her symptoms have completely resolved currently.  Recommend patient continue to monitor, if they recur, would recommend she come in to walk-in care or come in for office visit for further evaluation.  She was given refills of topical clindamycin to treat acne.  Also encouraged patient to discontinue smoking.    Return " "in about 1 month (around 2021) for Annual physical.    Subjective:      HPI: Manuel Pepe is a 25 y.o. female, here today for video visit, who presents for \"tugging\" or heart skipped a beat at 1am today. It was on and off for 30 minutes then went away.  Felt a fluttering sensation. She was still awake at that time.   Symptoms started yesterday.  Felt a heaviness and achiness.  Symptoms have resolved, no chest pain, shortness of breath, difficulty breathing, or sweatiness. No nausea, vomiting, or diaphoresis. Thinks it may have felt like heart burn.   Currently not feeling anxious.  When exerting herself today, no symptoms.  Smokes half pack per day.      Of note, on 2020, she was evaluated in the ED because she had a known exposure to COVID-19, and had chest pain like symptoms.  At that time, she was tested negative for COVID-19, though had an unremarkable chest x-ray and EKG done at that time.    She had also previously used topical clindamycin for acne in the past, would like refills of medication.      Medical History:     Patient Active Problem List   Diagnosis      (normal spontaneous vaginal delivery)     Fetal heart rate deceleration, delivered, current hospitalization     Nuchal cord with compression, delivered, current hospitalization     Excessive weight gain during pregnancy in third trimester     Morbid obesity (H)     Cholecystitis     S/P laparoscopic cholecystectomy     EGD- Gastritis and duodenitis      Gastritis and duodenitis       Past Medical History:   Diagnosis Date     Cholecystitis      EGD- Gastritis and deodenitis  2018    Gastritis and deodenitis      Gastritis and duodenitis 2018     GERD (gastroesophageal reflux disease)      Morbid obesity (H)      Vaginal delivery     x2       Past Surgical History:   Procedure Laterality Date     LAPAROSCOPIC CHOLECYSTECTOMY N/A 2017    Procedure: CHOLECYSTECTOMY, LAPAROSCOPIC;  Surgeon: Morris Neumann MD;  " Location: [facility] OR;  Service:        Current Medications:     Current Outpatient Medications   Medication Sig     clindamycin (CLINDAGEL) 1 % gel Apply to affected area 2 times daily       Family History:     Family History   Problem Relation Age of Onset     Diabetes Mother      Early death Brother      Stomach cancer Father        Review of Systems  All other systems reviewed and are negative.         Social History:     Social History     Tobacco Use   Smoking Status Current Every Day Smoker     Packs/day: 0.50     Years: 10.00     Pack years: 5.00     Types: Cigarettes   Smokeless Tobacco Never Used   Tobacco Comment    Quit, 1 month      Social History     Social History Narrative     Not on file         Objective:     GENERAL: Healthy, alert and no distress  EYES: Eyes grossly normal to inspection. No discharge or erythema, or obvious scleral/conjunctival abnormalities.  RESP: No audible wheeze, cough, or visible cyanosis.  No visible retractions or increased work of breathing.    NEURO: Cranial nerves grossly intact. Mentation and speech appropriate for age.  PSYCH: Mentation appears normal, affect normal/bright, judgement and insight intact, normal speech and appearance well-groomed  SKIN: acne noted on forehead        Video-Visit Details    Type of service:  Video Visit    Video End Time (time video stopped): 3:55pm  Originating Location (pt. Location): Home    Distant Location (provider location):  [facility]     Platform used for Video Visit: Tim Mark MD

## 2021-06-18 NOTE — PROGRESS NOTES
Assessment/Plan:        1. Epigastric pain  Consider gastritis    Repeat- Urinalysis-UC if Indicated   to ensure no bladder infection    Plan  - omeprazole (PRILOSEC) 40 MG capsule; Take 1-2 capsules (40-80 mg total) by mouth daily.  Dispense: 30 capsule; Refill: 1  - sucralfate (CARAFATE) 1 gram tablet; Take 1 tablet (1 g total) by mouth 4 (four) times a day.  Dispense: 120 tablet; Refill: 0      Continue with monitoring diet, cutting back on the fats, greasy, spicy, and acidic foods.      Close follow up with any worsening or no significant improvement as anticipated.         Subjective:    Patient ID:   Manuel Pepe is a 23 y.o. female here to establish care and for post hospital follow-up of abdominal pain with a past history of cholecystectomy said to have a constant epigastric pain without radiation of the burning and grading in quality.  It all started out when she and much of her friends when out drinking the night before she is a few drinks made of hard liquor.  Symptoms were also associated with nausea and vomiting but no change to the bowel movements.  She has been treated with Zofran, famotidine, and GI cocktail with improvement of her symptoms, and  had workup including CT of the abdomen which demonstrated normal bowel and appendix with an enlarged liver, CBC with a leukocytosis with blood count of 12.6 and a normal hemoglobin of 15, BMP with mild hypokalemia of potassium of 3.1 and normal renal function, and elevated bilirubin of total 2.2 and a direct 0.7 with normal enzymes, alk phos and lipase.  Also had a negative UPT, UA also appeared cloudy with small blood and protein, with large presence of leukocyte Estrace and few bacteria and positive pyuria largely believed to be of a contamination.  No UC is available.    She continues to feel the same as intermittently been nauseated and being careful with eating, and  seem to be helping some with the meds from the ER (famotidine and Zofran) has been  helpful to some degree        Review of Systems  Constitutional: negative  Eyes: negative  ENT and face: negative  Respiratory: negative  CV: negative  GI: See HPI  : neg , no dysuria frequency or urgency  Skin: negative   EXT: neg       The following patient's history were reviewed and updated as appropriate:   She  has a past medical history of Vaginal delivery.  She  does not have any pertinent problems on file.  She  has a past surgical history that includes ; Cholecystectomy; and Laparoscopic cholecystectomy (N/A, 2/18/2017).  Her family history includes Diabetes in her mother; Early death in her brother; Stomach cancer in her father.  She  reports that she has quit smoking. She has never used smokeless tobacco. She reports that she uses illicit drugs, including Marijuana. She reports that she does not drink alcohol..      Outpatient Encounter Prescriptions as of 6/11/2018   Medication Sig Dispense Refill     famotidine (PEPCID) 20 MG tablet Take 1 tablet (20 mg total) by mouth 2 (two) times a day as needed. 30 tablet 0     ondansetron (ZOFRAN ODT) 4 MG disintegrating tablet Take 1 tablet (4 mg total) by mouth every 8 (eight) hours as needed for nausea. 10 tablet 0     ondansetron (ZOFRAN) 4 MG tablet Take 1 tablet (4 mg total) by mouth every 8 (eight) hours as needed for nausea. 12 tablet 0     omeprazole (PRILOSEC) 40 MG capsule Take 1-2 capsules (40-80 mg total) by mouth daily. 30 capsule 1     sucralfate (CARAFATE) 1 gram tablet Take 1 tablet (1 g total) by mouth 4 (four) times a day. 120 tablet 0     No facility-administered encounter medications on file as of 6/11/2018.          Objective:   /68 (Patient Site: Right Arm, Patient Position: Sitting, Cuff Size: Adult Large)  Pulse (!) 56  Temp 98.5  F (36.9  C) (Oral)   Wt (!) 231 lb 8 oz (105 kg)  LMP 05/15/2018  SpO2 97%  BMI 38.52 kg/m2      Physical Exam  General Appearance:    Alert,  no acute distress, well hydrated    Eyes:    PERRL,  conjunctiva/corneas clear, non icterus    Throat:   Lips, mucosa, and tongue normal; teeth and gums normal   Neck:   Supple, symmetrical, trachea midline, no adenopathy;        thyroid:  No enlargement/tenderness/nodules; no carotid    bruit or JVD   Lungs:     Clear to auscultation bilaterally, respirations unlabored   Heart:    Regular rate and rhythm, S1 and S2 normal, no murmur, rub   or gallop   Abdomen:      Soft, palpable tenderness to the epigastric area diagnosis, normal bowel sounds, no rebound or guarding, no masses, no organomegaly   Extremities:   Extremities normal, atraumatic, no cyanosis or edema   Skin:   Skin color, texture, turgor normal, no rashes or lesions

## 2021-06-19 NOTE — LETTER
Letter by Boaz Larsen MD at      Author: Boaz Larsen MD Service: -- Author Type: --    Filed:  Encounter Date: 5/3/2019 Status: (Other)         Manuel Pepe  [address]  [place] [address]      May 3, 2019      Dear Manuel,    As a valued [facility] patient, your healthcare needs are our priority.  Your health care team has determined that you are due for an appointment regarding your Physical - Pap Smear.    To help prevent delays in your care, please call the [facility] at [phone].    We look forward to partnering with you to achieve optimal health and wellbeing.    Sincerely,  Your care team at [facility]

## 2021-06-19 NOTE — LETTER
Letter by Bela Michael PA-C at      Author: Bela Michael PA-C Service: -- Author Type: --    Filed:  Encounter Date: 10/18/2019 Status: Signed         Manuel Pepe  [address]  [place] [address]             October 18, 2019         Dear MsZak Pepe,    Below are the results from your recent visit:     Pap cell test is negative, next recheck would be in 3 years    Resulted Orders   Gynecologic Cytology (PAP Smear)   Result Value Ref Range    Case Report       Gynecologic Cytology Report                       Case: [id number]                                   Authorizing Provider:  Bela Michael,     Collected:           10/10/2019 1930                                     ANTWON                                                                         Ordering Location:     [facility]  Received:            10/10/2019 1930              First Screen:          Nathaly Siddiqui CT                                                                               (ASCP)                                                                       Specimen:    SUREPATH PAP, SCREENING, Endocervical/cervical                                             Interpretation  Negative for squamous intraepithelial lesion or malignancy.      Negative for squamous intraepithelial lesion or malignancy    Result Flag Normal Normal    Specimen Adequacy       Satisfactory for evaluation, endocervical/transformation zone component present    HPV Reflex? Yes regardless of result     HIGH RISK No     LMP/Menopause Date 9/25/19     Abnormal Bleeding No     Pt Status not applicable     Birth Control/Hormones None     Previous Normal/Date N/A     Prev Abn Date/Dx None     Cervical Appearance Normal    Wet Prep, Vaginal   Result Value Ref Range    Yeast Result No yeast seen No yeast seen    Trichomonas No Trichomonas seen No Trichomonas seen    Clue Cells, Wet Prep No Clue cells seen No Clue cells seen   HPV  High Risk DNA Cervical   Result Value Ref Range    HPV Source SurePath     HPV16 DNA Negative NEG    HPV18 DNA Negative NEG    Other HR HPV Negative NEG    Final Diagnosis SEE NOTES       Comment:      This patient's sample is negative for HPV DNA.  This test was developed and its performance characteristics determined by the  [facility]. It  has not been cleared or approved by the FDA. The laboratory is regulated under  CLIA as qualified to perform high-complexity testing. This test is used for  clinical purposes. It should not be regarded as investigational or for  research.  (Note)  METHODOLOGY:  The Roche archana 4800 system uses automated extraction,  simultaneous amplification of HPV (L1 region) and beta-globin,  followed by  real time detection of fluorescent labeled HPV and beta  globin using specific oligonucleotide probes . The test specifically  identifies types HPV 16 DNA and HPV 18 DNA while concurrently  detecting the rest of the high risk types (31, 33, 35, 39, 45, 51,  52, 56, 58, 59, 66 or 68).    COMMENTS:  This test is not intended for use as a screening device  for women under age 30 with normal cervical   cytology.  Results should  be correlated with cytologic and histologic findings. Close clinical  followup is recommended.        Specimen Description Cervical Cells       Comment:        Performed and/or entered by:  [address] [address]            Please call with questions or contact us using [employer].    Sincerely,        Electronically signed by Bela Michael PA-C

## 2021-06-19 NOTE — PROGRESS NOTES
Family Medicine Office Visit  [facility]  Patient Name: Manuel Pepe  Patient Age: 23 y.o.  YOB: 1995  MRN: [id number]    Date of Visit: 2018  Reason for Office Visit:   Chief Complaint   Patient presents with     Hospital Visit Follow Up     ED, JNS, , Vomiting and SOB      Emesis     Vomiting still happening. Happena at random.      Shortness of Breath           Assessment / Plan / Medical Decision Makin. Vomiting  Will do referral to GI and see if can help with a solution to cyclic pattern of vomiting.  - Ambulatory referral to Gastroenterology  - H. pylori Antigen, Stool(HPSAG); Future    2. Gastritis  Continue with current medications and will do referral to GI  - Ambulatory referral to Gastroenterology        Health Maintenance Review  Health Maintenance   Topic Date Due     HPV VACCINES (1 of 3 - Female 3 Dose Series) 2005     TD 18+ HE  2012     TDAP ADULT ONE TIME DOSE  2012     ADVANCE DIRECTIVES DISCUSSED WITH PATIENT  2012     PAP SMEAR  2018     INFLUENZA VACCINE RULE BASED (1) 2018         I am having Ms. Pepe maintain her ondansetron, famotidine, omeprazole, sucralfate, and metoclopramide.      HPI:  Manuel Pepe is a 23 y.o. year old who presents to the office today for follow up from the ER.  Multiple episodes of vomiting and unable to stop.  Went to the ER and had labs drawn and fluids and felt better.  States she was diagnosed with gastritis.  However, similar episode about 1 month ago in .  States she has been taking all her reflux medicine and hx of lap richard that was supposed to help with the emesis and still no improvement.  Worried about what to do for the next episode.  Last vomiting last week.  Just stopped on its own. Didn't feel like the meds helped.  When vomiting, vomited 4-5x/day.  Never seen GI previously        Review of Systems- pertinent positive in bold:  Constitutional: Fever,  chills, night sweats, fainting, weight change, fatigue, seizures, dizziness, sleeping difficulties, loud snoring/pauses in breathing  Eyes: change in vision, blurred or double vision, redness/eye pain  Ears, nose, mouth, throat: change in hearing, ear pain, hoarseness, difficulty swallowing, sores in the mouth or throat  Respiratory: shortness of breath, cough, bloody sputum, wheezing  Cardiovascular: chest pain, palpitations   Gastrointestinal: abdominal pain, heartburn/indigestion, nausea/vomiting, change in appetite, change in bowel habits, constipation or diarrhea, rectal bleeding/dark stools, difficulty swallowing  Urinary: painful urination, frequent urination, urinary urgency/incontinence, blood in urine/dark urine, nocturia  Musculoskeletal: backache/back pain (new or increasing), weakness, joint pain/stiffness (new or increasing), muscle cramps, swelling of hands, feet, ankles, leg pain/redness  Skin: change in moles/freckles, rash, nodules  Hematologic/lymphatic: swollen lymph glands, abnormal bruising/bleeding  Endocrine: excessive thirst/urination, cold or heat intolerance  Neurologic/emotional: worrisome memory change, numbness/tingling, anxiety, mood swings      Current Scheduled Meds:  Outpatient Encounter Prescriptions as of 8/1/2018   Medication Sig Dispense Refill     famotidine (PEPCID) 20 MG tablet Take 1 tablet (20 mg total) by mouth 2 (two) times a day as needed. 30 tablet 0     metoclopramide (REGLAN) 10 MG tablet Take 1 tablet (10 mg total) by mouth every 6 (six) hours as needed for nausea (Nausea). 20 tablet 0     omeprazole (PRILOSEC) 40 MG capsule Take 1-2 capsules (40-80 mg total) by mouth daily. 30 capsule 1     ondansetron (ZOFRAN) 4 MG tablet Take 1 tablet (4 mg total) by mouth every 8 (eight) hours as needed for nausea. 12 tablet 0     sucralfate (CARAFATE) 1 gram tablet Take 1 tablet (1 g total) by mouth 4 (four) times a day. 120 tablet 0     [DISCONTINUED] omeprazole (PRILOSEC) 20  "MG capsule Take 1 capsule 30 min before breakfast. 30 capsule 0     [DISCONTINUED] ondansetron (ZOFRAN ODT) 4 MG disintegrating tablet Take 1 tablet (4 mg total) by mouth every 8 (eight) hours as needed. 20 tablet 0     No facility-administered encounter medications on file as of 8/1/2018.      Past Medical History:   Diagnosis Date     Cholecystitis      GERD (gastroesophageal reflux disease)      Morbid obesity (H)      Vaginal delivery     x2     Past Surgical History:   Procedure Laterality Date     LAPAROSCOPIC CHOLECYSTECTOMY N/A 2/18/2017    Procedure: CHOLECYSTECTOMY, LAPAROSCOPIC;  Surgeon: Morris Neumann MD;  Location: [facility];  Service:      Social History   Substance Use Topics     Smoking status: Former Smoker     Smokeless tobacco: Never Used      Comment: 5-6 sticks a day     Alcohol use No       Objective / Physical Examination:  Vitals:    08/01/18 1046   BP: 110/72   Pulse: 65   SpO2: 100%   Weight: (!) 248 lb (112.5 kg)   Height: 5' 5\" (1.651 m)     Wt Readings from Last 3 Encounters:   08/01/18 (!) 248 lb (112.5 kg)   07/24/18 (!) 240 lb (108.9 kg)   07/23/18 (!) 240 lb (108.9 kg)     BP Readings from Last 3 Encounters:   08/01/18 110/72   07/24/18 124/69   07/23/18 150/84     Body mass index is 41.27 kg/(m^2).     General Appearance: Alert and oriented, cooperative, affect appropriate, speech clear, in no apparent distress  Back: Symmetrical and nontender  Lungs: Clear to auscultation bilaterally. Normal inspiratory and expiratory effort  Cardiovascular: Regular rate, normal S1, S2. No murmurs, rubs, or gallops  Abdomen: Bowel sounds active all four quadrants. Soft, non-tender. No hepatomegaly or splenomegaly. No bruits detected.   Extremities: Pulses 2+ and equal throughout. No edema. Strength equal throughout.  Integumentary: Warm and dry. Without suspicious looking lesions  Neuro: Alert and oriented, follows commands appropriately.     Orders Placed This Encounter   Procedures "     H. pylori Antigen, Stool(HPSAG)     Ambulatory referral to Gastroenterology   Followup: No Follow-up on file. earlier if needed.        Deborah Nix MD

## 2021-06-19 NOTE — LETTER
Letter by Bela Michael PA-C at      Author: Bela Michael PA-C Service: -- Author Type: --    Filed:  Encounter Date: 10/11/2019 Status: Signed         Manuel Pepe  [address]  [place] [address]             October 11, 2019         Dear Ms. Pepe,    Below are the results from your recent visit:    Resulted Orders   Wet Prep, Vaginal   Result Value Ref Range    Yeast Result No yeast seen No yeast seen    Trichomonas No Trichomonas seen No Trichomonas seen    Clue Cells, Wet Prep No Clue cells seen No Clue cells seen        No Bacterial vaginosis or yeast infection    Please call with questions or contact us using [employer].    Sincerely,        Electronically signed by Bela Michael PA-C

## 2021-06-19 NOTE — LETTER
Letter by Bela Michael PA-C at      Author: Bela Michael PA-C Service: -- Author Type: --    Filed:  Encounter Date: 10/10/2019 Status: Signed         October 10, 2019     Patient: Manuel Pepe   YOB: 1995   Date of Visit: 10/10/2019       To Whom It May Concern:    that Manuel Pepe was seen in clinic on Thursday October 10 th.     If you have any questions or concerns, please don't hesitate to call.    Sincerely,        Electronically signed by Bela Michael PA-C

## 2021-06-28 NOTE — PROGRESS NOTES
Progress Notes by Bela Michael PA-C at 10/10/2019  4:40 PM     Author: Bela Michael PA-C Service: -- Author Type: Physician Assistant    Filed: 10/14/2019  7:38 AM Encounter Date: 10/10/2019 Status: Signed    : Bela Michael PA-C (Physician Assistant)       FEMALE PREVENTATIVE EXAM    Assessment and Plan:       1. Well female exam with routine gynecological exam-  Advised health diet, 6-8 fruit and vegetables daily and discussed juicing in am with protein powder for breakfast.  Advised regular exercise, discussed regular aerobic exercise and strength training.  Advised not more than 1 alcoholic drinks in any given day.  Caffeine: not more than 2 daily.  Water: 6-8 glasses daily.  Multivitamin with Vitamin D 2000 units and iron for women.         HM2(CBC w/o Differential); Future  - Lipid Cascade; Future  - Thyroid Downey; Future  - Comprehensive Metabolic Panel; Future  - Gynecologic Cytology (PAP Smear)  - Chlamydia trachomatis & Neisseria gonorrhoeae, Amplified Detection; Future  - HPV High Risk DNA Cervical    2. Vaginal odor  Negative wet prep was called to the patient.  No treatment at this time.  - Wet Prep, Vaginal    3. Chronic pain of both shoulders  Discussed breast reduction at length, answered all her questions.- Ambulatory referral to Plastic Surgery    4. Large breasts  The same treatment as 3.  - Ambulatory referral to Plastic Surgery    5. Change in facial mole  Nodular, shiny, pearly lesion possibly basal cell, since this is on the face will also refer this to plastic surgery.  - Ambulatory referral to Plastic Surgery    6. Other acne  Discussed the use of Clindagel for acne.  Prescription given.  - clindamycin (CLINDAGEL) 1 % gel; Apply to affected area 2 times daily  Dispense: 30 g; Refill: 0     Next follow up:  Return in about 1 year (around 10/10/2020) for With PCP, Annual physical.    Immunization Review  Adult Imm Review: Due today, orders placed  BMI:  39.5  Non-smoker    I discussed the following with the patient:   Adult Healthy Living: Importance of regular exercise  Healthy nutrition  Supplement use        Subjective:   Chief Complaint: Manuel Pepe is an 24 y.o. female here for a preventative health visit.     HPI:  Manuel Pepe is seen for yearly physical.  She would also like to discuss breast reduction.  She has chronic shoulder and neck pain due to large breasts.  She gets pain from straps of her bra.  She also sometimes gets rashes under her breasts.  She also has a mole that is changing on her left temple area.  It has been slowly getting larger.  She also notes the development of acne across her T-zone, she does not have cystic forms.  This is started after her last pregnancy.  She notes some change in vaginal odor.  Denies rash, pelvic pain, dyspareunia, irregular periods.  She has one sexual partner, no concerns for STIs.  She does need her Pap smear.  She has never had a positive Pap smear.  ROS: Patient denies fever, chills, sweats, chest pain, palpitations, shortness of breath, wheezing, claudication, lower leg abdominal pain, nausea, vomiting, diarrhea, constipation, weight loss, bone pain, back pain, dysuria.  Otherwise as in HPI.    Healthy Habits  Are you taking a daily aspirin? No  Do you typically exercising at least 40 min, 3-4 times per week?  Yes  Do you usually eat at least 4 servings of fruit and vegetables a day, include whole grains and fiber and avoid regularly eating high fat foods? NO  Have you had an eye exam in the past two years? NO  Do you see a dentist twice per year? Yes  Do you have any concerns regarding sleep? No    Safety Screen  If you own firearms, are they secured in a locked gun cabinet or with trigger locks? The patient does not own any firearms  Do you feel you are safe where you are living?: Yes (10/10/2019  5:00 PM)      Review of Systems:  Please see above.  The rest of the review of systems are negative for all  "systems.     Pap History:   No - age 21-29 PAP every 3 years recommended  Cancer Screening       Status Date      PAP SMEAR Next Due 10/10/2022      Done 10/10/2019      Patient has more history with this topic...          Patient Care Team:  Bela Michael PA-C as PCP - General (Physician Assistant)  Boaz Larsen MD as Assigned PCP        History     Reviewed By Date/Time Sections Reviewed    Bela Michael PA-C 10/14/2019  7:34 AM Tobacco, Alcohol, Drug Use, Sexual Activity, Family    Bela Michael PA-C 10/14/2019  7:33 AM Medical, Surgical    Chanelle Harp, JAMES 10/10/2019  5:01 PM Medical, Surgical, Tobacco, Alcohol, Drug Use, Sexual Activity, Family            Objective:   Vital Signs:   Visit Vitals  /68 (Patient Site: Right Arm, Patient Position: Sitting, Cuff Size: Adult Regular)   Pulse 72   Temp 98.2  F (36.8  C) (Oral)   Ht 5' 4\" (1.626 m)   Wt (!) 231 lb (104.8 kg)   LMP 09/26/2019   SpO2 98%   BMI 39.65 kg/m           PHYSICAL EXAM  Alert, cooperative, well-hydrated.  Appears well.  Eyes: Pupils equal, round, reactive to light.  HEENT: Sclera white, nares patent, MMM   Lungs: Clear to auscultation. No retractions, no increased work of respiration, equal chest rise.   Heart: Regular rate and rhythm, no murmurs, clicks,    Gallops.  Abdomen: Soft, bowel sounds in 4 quadrants with no tenderness to palpation, no organomegaly or masses, no aortic or renal bruits.  Extremities: no tenderness to palpation of gastrocnemius, bilaterally.  Skin: no increased warmth, edema, or erythema of lower legs bilaterally.  Facial skin: Nodular, pearly, raised lesion on left temple centimeter in diameter.  T-zone pustular acne without cystic forms or erythema.  Back:  No cervical, thoracic or lumbar tenderness to spinous processes or musculature.  Breasts: Normal appearing, no nipple discharge or skin changes bilaterally, no palpable masses or tenderness, no axillary adenopathy " bilaterally.  : normal appearing female with no external labial rash, no vaginal rash, moderate white exudates and normal appearing cervix. No tenderness to bimanual exam and no palpable masses.               Medication List          Accurate as of October 10, 2019 11:59 PM. If you have any questions, ask your nurse or doctor.            START taking these medications    clindamycin 1 % gel  Also known as:  CLINDAGEL  INSTRUCTIONS:  Apply to affected area 2 times daily  Started by:  Bela Michael PA-C           STOP taking these medications    famotidine 20 MG tablet  Also known as:  PEPCID  Stopped by:  Bela Michael PA-C     metoclopramide 10 MG tablet  Also known as:  REGLAN  Stopped by:  Bela Michael PA-C     omeprazole 40 MG capsule  Also known as:  PriLOSEC  Stopped by:  Bela Michael PA-C     ondansetron 4 MG tablet  Also known as:  ZOFRAN  Stopped by:  Bela Michael PA-C     promethazine 25 MG tablet  Also known as:  PHENERGAN  Stopped by:  Bela Michael PA-C     sucralfate 1 gram tablet  Also known as:  CARAFATE  Stopped by:  Bela Michael PA-C           Where to Get Your Medications      These medications were sent to [id number] - [place], [place] - [address] AT [facility] [address]  [address] [place] [address]    Phone:  [phone]     clindamycin 1 % gel         Additional Screenings Completed Today:

## 2021-07-03 NOTE — ANESTHESIA PREPROCEDURE EVALUATION
Anesthesia Preprocedure Evaluation by Desmond Tinoco MD at 2/18/2017  8:15 AM     Author: Desmond Tinoco MD Service: -- Author Type: Physician    Filed: 2/18/2017  8:18 AM Date of Service: 2/18/2017  8:15 AM Status: Signed    : Desmond Tinoco MD (Physician)       Anesthesia Evaluation      Patient summary reviewed   No history of anesthetic complications     Airway   Mallampati: III  Neck ROM: full   Pulmonary - normal exam   (+) a smoker                         Cardiovascular - negative ROS and normal exam   Neuro/Psych - negative ROS     Endo/Other    (+) obesity (BMI 42),      GI/Hepatic/Renal      Comments: Last emesis 9pm.           Dental - normal exam   (+) caps                           Anesthesia Plan  Planned anesthetic: general endotracheal    ASA 3   Induction: intravenous   Anesthetic plan and risks discussed with: patient and spouse  Anesthesia plan special considerations: antiemetics,   Post-op plan: routine recovery          Chemistry        Component Value Date/Time     02/18/2017 0350    K 3.8 02/18/2017 0350     02/18/2017 0350    CO2 23 02/18/2017 0350    BUN 17 02/18/2017 0350    CREATININE 0.76 02/18/2017 0350     02/18/2017 0350        Component Value Date/Time    CALCIUM 9.0 02/18/2017 0350    ALKPHOS 37 (L) 02/18/2017 0350    AST 16 02/18/2017 0350    ALT 37 02/18/2017 0350    BILITOT 0.3 02/18/2017 0350        Lab Results   Component Value Date    WBC 13.7 (H) 02/18/2017    HGB 13.9 02/18/2017    HCT 40.0 02/18/2017    MCV 84 02/18/2017     02/18/2017

## 2021-09-05 ENCOUNTER — HEALTH MAINTENANCE LETTER (OUTPATIENT)
Age: 26
End: 2021-09-05

## 2022-10-23 ENCOUNTER — HEALTH MAINTENANCE LETTER (OUTPATIENT)
Age: 27
End: 2022-10-23

## 2023-09-16 ENCOUNTER — HOSPITAL ENCOUNTER (EMERGENCY)
Facility: HOSPITAL | Age: 28
Discharge: HOME | End: 2023-09-16
Attending: STUDENT IN AN ORGANIZED HEALTH CARE EDUCATION/TRAINING PROGRAM | Admitting: STUDENT IN AN ORGANIZED HEALTH CARE EDUCATION/TRAINING PROGRAM
Payer: COMMERCIAL

## 2023-09-16 ENCOUNTER — NURSE TRIAGE (OUTPATIENT)
Dept: NURSING | Facility: CLINIC | Age: 28
End: 2023-09-16
Payer: COMMERCIAL

## 2023-09-16 VITALS
TEMPERATURE: 97.4 F | RESPIRATION RATE: 24 BRPM | DIASTOLIC BLOOD PRESSURE: 75 MMHG | WEIGHT: 280.65 LBS | HEART RATE: 80 BPM | BODY MASS INDEX: 46.7 KG/M2 | OXYGEN SATURATION: 94 % | SYSTOLIC BLOOD PRESSURE: 131 MMHG

## 2023-09-16 DIAGNOSIS — R11.2 NAUSEA AND VOMITING, UNSPECIFIED VOMITING TYPE: ICD-10-CM

## 2023-09-16 LAB
ALBUMIN SERPL BCG-MCNC: 4.8 G/DL (ref 3.5–5.2)
ALP SERPL-CCNC: 44 U/L (ref 35–104)
ALT SERPL W P-5'-P-CCNC: 73 U/L (ref 0–50)
ANION GAP SERPL CALCULATED.3IONS-SCNC: 17 MMOL/L (ref 7–15)
AST SERPL W P-5'-P-CCNC: 60 U/L (ref 0–45)
BASOPHILS # BLD AUTO: 0.1 10E3/UL (ref 0–0.2)
BASOPHILS NFR BLD AUTO: 1 %
BILIRUB DIRECT SERPL-MCNC: <0.2 MG/DL (ref 0–0.3)
BILIRUB SERPL-MCNC: 0.7 MG/DL
BUN SERPL-MCNC: 15.9 MG/DL (ref 6–20)
CALCIUM SERPL-MCNC: 9.1 MG/DL (ref 8.6–10)
CHLORIDE SERPL-SCNC: 101 MMOL/L (ref 98–107)
CREAT SERPL-MCNC: 0.65 MG/DL (ref 0.51–0.95)
DEPRECATED HCO3 PLAS-SCNC: 23 MMOL/L (ref 22–29)
EGFRCR SERPLBLD CKD-EPI 2021: >90 ML/MIN/1.73M2
EOSINOPHIL # BLD AUTO: 0 10E3/UL (ref 0–0.7)
EOSINOPHIL NFR BLD AUTO: 1 %
ERYTHROCYTE [DISTWIDTH] IN BLOOD BY AUTOMATED COUNT: 13 % (ref 10–15)
GLUCOSE SERPL-MCNC: 118 MG/DL (ref 70–99)
HCG UR QL: NEGATIVE
HCT VFR BLD AUTO: 42.5 % (ref 35–47)
HGB BLD-MCNC: 14.2 G/DL (ref 11.7–15.7)
IMM GRANULOCYTES # BLD: 0 10E3/UL
IMM GRANULOCYTES NFR BLD: 1 %
LIPASE SERPL-CCNC: 22 U/L (ref 13–60)
LYMPHOCYTES # BLD AUTO: 1.9 10E3/UL (ref 0.8–5.3)
LYMPHOCYTES NFR BLD AUTO: 22 %
MCH RBC QN AUTO: 29.6 PG (ref 26.5–33)
MCHC RBC AUTO-ENTMCNC: 33.4 G/DL (ref 31.5–36.5)
MCV RBC AUTO: 89 FL (ref 78–100)
MONOCYTES # BLD AUTO: 0.4 10E3/UL (ref 0–1.3)
MONOCYTES NFR BLD AUTO: 4 %
NEUTROPHILS # BLD AUTO: 6.2 10E3/UL (ref 1.6–8.3)
NEUTROPHILS NFR BLD AUTO: 71 %
NRBC # BLD AUTO: 0 10E3/UL
NRBC BLD AUTO-RTO: 0 /100
PLATELET # BLD AUTO: 284 10E3/UL (ref 150–450)
POTASSIUM SERPL-SCNC: 4.2 MMOL/L (ref 3.4–5.3)
PROT SERPL-MCNC: 8.1 G/DL (ref 6.4–8.3)
RBC # BLD AUTO: 4.79 10E6/UL (ref 3.8–5.2)
SODIUM SERPL-SCNC: 141 MMOL/L (ref 136–145)
WBC # BLD AUTO: 8.6 10E3/UL (ref 4–11)

## 2023-09-16 PROCEDURE — 96361 HYDRATE IV INFUSION ADD-ON: CPT

## 2023-09-16 PROCEDURE — 83690 ASSAY OF LIPASE: CPT | Performed by: STUDENT IN AN ORGANIZED HEALTH CARE EDUCATION/TRAINING PROGRAM

## 2023-09-16 PROCEDURE — 250N000011 HC RX IP 250 OP 636: Mod: JZ | Performed by: STUDENT IN AN ORGANIZED HEALTH CARE EDUCATION/TRAINING PROGRAM

## 2023-09-16 PROCEDURE — 99284 EMERGENCY DEPT VISIT MOD MDM: CPT | Mod: 25

## 2023-09-16 PROCEDURE — 258N000003 HC RX IP 258 OP 636: Performed by: STUDENT IN AN ORGANIZED HEALTH CARE EDUCATION/TRAINING PROGRAM

## 2023-09-16 PROCEDURE — 82248 BILIRUBIN DIRECT: CPT | Performed by: STUDENT IN AN ORGANIZED HEALTH CARE EDUCATION/TRAINING PROGRAM

## 2023-09-16 PROCEDURE — 81025 URINE PREGNANCY TEST: CPT | Performed by: STUDENT IN AN ORGANIZED HEALTH CARE EDUCATION/TRAINING PROGRAM

## 2023-09-16 PROCEDURE — 85025 COMPLETE CBC W/AUTO DIFF WBC: CPT | Performed by: STUDENT IN AN ORGANIZED HEALTH CARE EDUCATION/TRAINING PROGRAM

## 2023-09-16 PROCEDURE — 96374 THER/PROPH/DIAG INJ IV PUSH: CPT

## 2023-09-16 PROCEDURE — 36415 COLL VENOUS BLD VENIPUNCTURE: CPT | Performed by: STUDENT IN AN ORGANIZED HEALTH CARE EDUCATION/TRAINING PROGRAM

## 2023-09-16 RX ORDER — ONDANSETRON 2 MG/ML
4 INJECTION INTRAMUSCULAR; INTRAVENOUS ONCE
Status: COMPLETED | OUTPATIENT
Start: 2023-09-16 | End: 2023-09-16

## 2023-09-16 RX ADMIN — ONDANSETRON 4 MG: 2 INJECTION INTRAMUSCULAR; INTRAVENOUS at 08:41

## 2023-09-16 RX ADMIN — SODIUM CHLORIDE, POTASSIUM CHLORIDE, SODIUM LACTATE AND CALCIUM CHLORIDE 1000 ML: 600; 310; 30; 20 INJECTION, SOLUTION INTRAVENOUS at 08:51

## 2023-09-16 ASSESSMENT — ACTIVITIES OF DAILY LIVING (ADL): ADLS_ACUITY_SCORE: 35

## 2023-09-16 NOTE — ED PROVIDER NOTES
EMERGENCY DEPARTMENT ENCOUNTER      NAME: Manuel Pepe  AGE: 28 year old female  YOB: 1995  MRN: [id number]  EVALUATION DATE & TIME: 9/16/2023  8:20 AM    PCP: No Ref-Primary, Physician    ED PROVIDER: Paresh Sanders MD      Chief Complaint   Patient presents with    Vomiting       FINAL IMPRESSION:  1. Nausea and vomiting, unspecified vomiting type        ED COURSE & MEDICAL DECISION MAKING:    Pertinent Labs & Imaging studies reviewed. (See chart for details)  28 year old female presents to the Emergency Department for evaluation of nausea, vomiting, abdominal pain    8:24 AM I met with the patient, obtained history, performed an initial exam, and discussed options and plan for diagnostics and treatment here in the ED.  10:33 AM Reevaluated the patient and updated on results. Discussed plan for discharge.      ED Course as of 09/16/23 1044   Sat Sep 16, 2023   0830 Patient is a 28-year-old female with a past medical history significant for obesity and cholecystectomy and gastric ulcers who presents to the emergency department with nausea, vomiting, epigastric pain with tenderness on exam in the setting of alcohol use last night.  No changes in bowel habits, no fevers, last stool was yesterday, no hematemesis, no tachycardia or hypotension.  She appears uncomfortable.  Differential includes pancreatitis, gastritis, retained gallstone, dehydration, alcohol poisoning, early pregnancy.  Given that she has no lower abdominal tenderness, low suspicion for appendicitis or  complaints.   0938 No leukocytosis or anemia.  No pancreatitis.  She does have a slight elevation in AST and ALT, but nonspecific at this time, and no hyperbilirubinemia.  No electrolyte abnormalities, but she does have a mild elevation in anion gap as expected from vomiting, no kidney injury.   1002 Urine pregnancy negative.       Medical Decision Making    History:  Supplemental history from: Documented in chart, if  applicable  External Record(s) reviewed: Documented in chart, if applicable.    Work Up:  Chart documentation includes differential considered and any EKGs or imaging independently interpreted by provider, where specified.  In additional to work up documented, I considered the following work up: Documented in chart, if applicable.    External consultation:  Discussion of management with another provider: Documented in chart, if applicable    Complicating factors:  Care impacted by chronic illness: Other: stomach ulcers  Care affected by social determinants of health: N/A    Disposition considerations: Discharge. No recommendations on prescription strength medication(s). See documentation for any additional details.      At the conclusion of the encounter I discussed the results of all of the tests and the disposition. The questions were answered. The patient or family acknowledged understanding and was agreeable with the care plan.     0 minutes of critical care time     MEDICATIONS GIVEN IN THE EMERGENCY:  Medications   ondansetron (ZOFRAN) injection 4 mg (4 mg Intravenous $Given 9/16/23 0888)   lactated ringers BOLUS 1,000 mL (0 mLs Intravenous Stopped 9/16/23 1028)       NEW PRESCRIPTIONS STARTED AT TODAY'S ER VISIT  Discharge Medication List as of 9/16/2023 10:31 AM        =================================================================    HPI    Patient information was obtained from: patient    Use of : N/A    Manuel Pepe is a 28 year old female with a pertinent history of obesity, GERD, gastric ulcers, who presents to this ED by private car with  for evaluation of abdominal pain and vomiting. Patient reports drinking alcohol at her boyfriend's sister's house last night. Since arriving home last night she has been consistently vomiting. She has been trying to drink water and juices but is unable to keep down liquids. Endorses some mild associated epigastric pain that was not present when  vomiting started. No hematemesis. Denies any diarrhea, constipation, black or bloody stools. Last BM was yesterday. Patient denies any recreational drug use last night. She was drinking at boyfriend's sister's house and denies possibility of being drugged.    Patient endorses mild runny nose and congestion with seasonal allergies, but otherwise denies recent fevers or illnesses. Denies any hematuria, dysuria, or other urinary symptoms. Denies chance of pregnancy.    Patient reports history of cholecystectomy and history of stomach ulcers. Denies history of pancreatitis. She denies any other complaints or concerns.    PAST MEDICAL HISTORY:  Past Medical History:   Diagnosis Date    Cholecystitis     Gastritis and duodenitis 9/13/2018    GERD (gastroesophageal reflux disease)     History of esophagogastroduodenoscopy (EGD) 9/13/2018    Gastritis and deodenitis     Morbid obesity (H)     Vaginal delivery     x2       PAST SURGICAL HISTORY:  Past Surgical History:   Procedure Laterality Date    LAPAROSCOPIC CHOLECYSTECTOMY N/A 2/18/2017    Procedure: CHOLECYSTECTOMY, LAPAROSCOPIC;  Surgeon: Morris Neumann MD;  [place] [facility]:        CURRENT MEDICATIONS:    clindamycin (CLINDAMAX) 1 % external gel      ALLERGIES:  No Known Allergies    FAMILY HISTORY:  Family History   Problem Relation Age of Onset    Diabetes Mother     Early Death Brother     Stomach Cancer Father        SOCIAL HISTORY:   Social History     Socioeconomic History    Marital status: Single   Tobacco Use    Smoking status: Every Day     Packs/day: 0.50     Years: 10.00     Pack years: 5.00     Types: Cigarettes    Smokeless tobacco: Never    Tobacco comments:     Quit, 1 month   Substance and Sexual Activity    Alcohol use: Yes     Comment: Alcoholic Drinks/day: occasionally     Drug use: Not Currently     Types: Marijuana    Sexual activity: Yes     Partners: Male       VITALS:  /75   Pulse 80   Temp 97.4  F (36.3  C)  (Temporal)   Resp 24   Wt 127.3 kg (280 lb 10.3 oz)   SpO2 94%   BMI 46.70 kg/m      PHYSICAL EXAM    Physical Exam  Vitals and nursing note reviewed.   Constitutional:       General: She is not in acute distress.     Appearance: Normal appearance. She is obese. She is not ill-appearing.   HENT:      Head: Normocephalic and atraumatic.      Nose: Nose normal.      Mouth/Throat:      Mouth: Mucous membranes are moist.      Pharynx: Oropharynx is clear.   Eyes:      Extraocular Movements: Extraocular movements intact.      Conjunctiva/sclera: Conjunctivae normal.      Pupils: Pupils are equal, round, and reactive to light.   Cardiovascular:      Rate and Rhythm: Normal rate and regular rhythm.      Pulses: Normal pulses.      Heart sounds: Normal heart sounds. No murmur heard.  Pulmonary:      Effort: Pulmonary effort is normal. No respiratory distress.      Breath sounds: Normal breath sounds.   Abdominal:      General: Abdomen is flat. There is no distension.      Palpations: Abdomen is soft.      Tenderness: There is abdominal tenderness (epigastric). There is no guarding or rebound.   Musculoskeletal:         General: Normal range of motion.      Cervical back: Normal range of motion.      Right lower leg: No edema.      Left lower leg: No edema.   Skin:     General: Skin is warm and dry.      Capillary Refill: Capillary refill takes less than 2 seconds.   Neurological:      General: No focal deficit present.      Mental Status: She is alert and oriented to person, place, and time. Mental status is at baseline.   Psychiatric:         Mood and Affect: Mood normal.         Behavior: Behavior normal.         Thought Content: Thought content normal.         Judgment: Judgment normal.            LAB:  All pertinent labs reviewed and interpreted.  Results for orders placed or performed during the hospital encounter of 09/16/23   HCG qualitative urine (UPT)   Result Value Ref Range    hCG Urine Qualitative Negative  Negative   Basic metabolic panel   Result Value Ref Range    Sodium 141 136 - 145 mmol/L    Potassium 4.2 3.4 - 5.3 mmol/L    Chloride 101 98 - 107 mmol/L    Carbon Dioxide (CO2) 23 22 - 29 mmol/L    Anion Gap 17 (H) 7 - 15 mmol/L    Urea Nitrogen 15.9 6.0 - 20.0 mg/dL    Creatinine 0.65 0.51 - 0.95 mg/dL    Calcium 9.1 8.6 - 10.0 mg/dL    Glucose 118 (H) 70 - 99 mg/dL    GFR Estimate >90 >60 mL/min/1.73m2   Hepatic function panel   Result Value Ref Range    Protein Total 8.1 6.4 - 8.3 g/dL    Albumin 4.8 3.5 - 5.2 g/dL    Bilirubin Total 0.7 <=1.2 mg/dL    Alkaline Phosphatase 44 35 - 104 U/L    AST 60 (H) 0 - 45 U/L    ALT 73 (H) 0 - 50 U/L    Bilirubin Direct <0.20 0.00 - 0.30 mg/dL   Result Value Ref Range    Lipase 22 13 - 60 U/L   CBC with platelets and differential   Result Value Ref Range    WBC Count 8.6 4.0 - 11.0 10e3/uL    RBC Count 4.79 3.80 - 5.20 10e6/uL    Hemoglobin 14.2 11.7 - 15.7 g/dL    Hematocrit 42.5 35.0 - 47.0 %    MCV 89 78 - 100 fL    MCH 29.6 26.5 - 33.0 pg    MCHC 33.4 31.5 - 36.5 g/dL    RDW 13.0 10.0 - 15.0 %    Platelet Count 284 150 - 450 10e3/uL    % Neutrophils 71 %    % Lymphocytes 22 %    % Monocytes 4 %    % Eosinophils 1 %    % Basophils 1 %    % Immature Granulocytes 1 %    NRBCs per 100 WBC 0 <1 /100    Absolute Neutrophils 6.2 1.6 - 8.3 10e3/uL    Absolute Lymphocytes 1.9 0.8 - 5.3 10e3/uL    Absolute Monocytes 0.4 0.0 - 1.3 10e3/uL    Absolute Eosinophils 0.0 0.0 - 0.7 10e3/uL    Absolute Basophils 0.1 0.0 - 0.2 10e3/uL    Absolute Immature Granulocytes 0.0 <=0.4 10e3/uL    Absolute NRBCs 0.0 10e3/uL       RADIOLOGY:  Reviewed all pertinent imaging. Please see official radiology report.  No orders to display       PROCEDURES:   None.      [employer] [facility] Documentation:   CMS Diagnoses:         I, Gera Guadarrama, am serving as a scribe to document services personally performed by Paresh Sanders MD, based on my observation and the provider's statements to me. I,  Paresh Sanders MD, attest that Gera Guadarrama is acting in a scribe capacity, has observed my performance of the services and has documented them in accordance with my direction.      Paresh Sanders MD  [facility] EMERGENCY DEPARTMENT  [address] [place] [id number]  [phone]       Paresh Sanders MD  09/16/23 1044

## 2023-09-16 NOTE — TELEPHONE ENCOUNTER
"Patient calling. She went out last night and was drinking and is constantly vomiting and has dry heaves. States that her stomach very sour. Everything she drinks comes back up, she has not tried solid food or any over-the counter medications. She endorses shaking. It started overnight. She estimates that she's vomited 10 times since it started, and is actively vomiting while on the call. Vomitus is orange/yellow.    Care advice is for patient to go to the ED. Second-level triage not initiated, patient will go to [facility] emergency department.     Ashley Fernandez RN  [facility]  September 16, 2023, 7:46 AM    Reason for Disposition   [1] SEVERE vomiting (e.g., 6 or more times/day) AND [2] present > 8 hours (Exception: Patient sounds well, is drinking liquids, does not sound dehydrated, and vomiting has lasted less than 24 hours.)    Additional Information   Negative: Shock suspected (e.g., cold/pale/clammy skin, too weak to stand, low BP, rapid pulse)   Negative: Difficult to awaken or acting confused (e.g., disoriented, slurred speech)   Negative: Sounds like a life-threatening emergency to the triager   Negative: Vomiting occurs only while coughing   Negative: [1] Pregnant < 20 Weeks AND [2] nausea/vomiting began in early pregnancy (i.e., 4-8 weeks pregnant)   Negative: Chest pain   Negative: Headache is main symptom   Negative: Vomiting (or Nausea) in a cancer patient who is currently (or recently) receiving chemotherapy or radiation therapy, or cancer patient who has metastatic or end-stage cancer and is receiving palliative care   Negative: [1] Vomiting AND [2] contains red blood or black (\"coffee ground\") material  (Exception: Few red streaks in vomit that only happened once.)   Negative: Severe pain in one eye   Negative: Recent head injury (within last 3 days)   Negative: Recent abdominal injury (within last 3 days)   Negative: [1] Insulin-dependent diabetes (Type I) AND [2] glucose > " 400 mg/dl (22 mmol/l)   Negative: [1] Vomiting AND [2] hernia is more painful or swollen than usual    Protocols used: Vomiting-A-AH

## 2023-09-16 NOTE — ED TRIAGE NOTES
"She was drinking alcohol last night and now she is vomiting. She has pain in her abdomen she attributes to \"puking.\"        "

## 2023-11-11 ENCOUNTER — HEALTH MAINTENANCE LETTER (OUTPATIENT)
Age: 28
End: 2023-11-11

## 2024-12-28 ENCOUNTER — HEALTH MAINTENANCE LETTER (OUTPATIENT)
Age: 29
End: 2024-12-28

## 2025-05-30 ENCOUNTER — RESULTS FOLLOW-UP (OUTPATIENT)
Dept: FAMILY MEDICINE | Facility: CLINIC | Age: 30
End: 2025-05-30